# Patient Record
Sex: MALE | Race: BLACK OR AFRICAN AMERICAN | NOT HISPANIC OR LATINO | URBAN - METROPOLITAN AREA
[De-identification: names, ages, dates, MRNs, and addresses within clinical notes are randomized per-mention and may not be internally consistent; named-entity substitution may affect disease eponyms.]

---

## 2023-04-07 ENCOUNTER — EMERGENCY (EMERGENCY)
Facility: HOSPITAL | Age: 63
LOS: 1 days | Discharge: HOME | End: 2023-04-07
Attending: EMERGENCY MEDICINE | Admitting: EMERGENCY MEDICINE
Payer: MEDICARE

## 2023-04-07 VITALS
SYSTOLIC BLOOD PRESSURE: 137 MMHG | RESPIRATION RATE: 17 BRPM | DIASTOLIC BLOOD PRESSURE: 83 MMHG | TEMPERATURE: 98 F | OXYGEN SATURATION: 96 % | HEART RATE: 69 BPM

## 2023-04-07 VITALS
SYSTOLIC BLOOD PRESSURE: 145 MMHG | DIASTOLIC BLOOD PRESSURE: 85 MMHG | HEART RATE: 82 BPM | HEIGHT: 74 IN | TEMPERATURE: 100 F | OXYGEN SATURATION: 97 % | WEIGHT: 186.95 LBS | RESPIRATION RATE: 16 BRPM

## 2023-04-07 DIAGNOSIS — K66.8 OTHER SPECIFIED DISORDERS OF PERITONEUM: ICD-10-CM

## 2023-04-07 DIAGNOSIS — F17.200 NICOTINE DEPENDENCE, UNSPECIFIED, UNCOMPLICATED: ICD-10-CM

## 2023-04-07 DIAGNOSIS — R91.1 SOLITARY PULMONARY NODULE: ICD-10-CM

## 2023-04-07 DIAGNOSIS — K64.9 UNSPECIFIED HEMORRHOIDS: ICD-10-CM

## 2023-04-07 DIAGNOSIS — R10.31 RIGHT LOWER QUADRANT PAIN: ICD-10-CM

## 2023-04-07 DIAGNOSIS — R10.32 LEFT LOWER QUADRANT PAIN: ICD-10-CM

## 2023-04-07 DIAGNOSIS — R10.30 LOWER ABDOMINAL PAIN, UNSPECIFIED: ICD-10-CM

## 2023-04-07 LAB
ALBUMIN SERPL ELPH-MCNC: 3.9 G/DL — SIGNIFICANT CHANGE UP (ref 3.3–5)
ALP SERPL-CCNC: 65 U/L — SIGNIFICANT CHANGE UP (ref 40–120)
ALT FLD-CCNC: 12 U/L — SIGNIFICANT CHANGE UP (ref 10–45)
ANION GAP SERPL CALC-SCNC: 8 MMOL/L — SIGNIFICANT CHANGE UP (ref 5–17)
AST SERPL-CCNC: 26 U/L — SIGNIFICANT CHANGE UP (ref 10–40)
BASOPHILS # BLD AUTO: 0.02 K/UL — SIGNIFICANT CHANGE UP (ref 0–0.2)
BASOPHILS NFR BLD AUTO: 0.3 % — SIGNIFICANT CHANGE UP (ref 0–2)
BILIRUB SERPL-MCNC: 0.4 MG/DL — SIGNIFICANT CHANGE UP (ref 0.2–1.2)
BUN SERPL-MCNC: 12 MG/DL — SIGNIFICANT CHANGE UP (ref 7–23)
CALCIUM SERPL-MCNC: 9.6 MG/DL — SIGNIFICANT CHANGE UP (ref 8.4–10.5)
CHLORIDE SERPL-SCNC: 100 MMOL/L — SIGNIFICANT CHANGE UP (ref 96–108)
CO2 SERPL-SCNC: 27 MMOL/L — SIGNIFICANT CHANGE UP (ref 22–31)
CREAT SERPL-MCNC: 1.01 MG/DL — SIGNIFICANT CHANGE UP (ref 0.5–1.3)
EGFR: 85 ML/MIN/1.73M2 — SIGNIFICANT CHANGE UP
EOSINOPHIL # BLD AUTO: 0.19 K/UL — SIGNIFICANT CHANGE UP (ref 0–0.5)
EOSINOPHIL NFR BLD AUTO: 2.9 % — SIGNIFICANT CHANGE UP (ref 0–6)
GLUCOSE SERPL-MCNC: 79 MG/DL — SIGNIFICANT CHANGE UP (ref 70–99)
HCT VFR BLD CALC: 49 % — SIGNIFICANT CHANGE UP (ref 39–50)
HGB BLD-MCNC: 15.8 G/DL — SIGNIFICANT CHANGE UP (ref 13–17)
IMM GRANULOCYTES NFR BLD AUTO: 0.3 % — SIGNIFICANT CHANGE UP (ref 0–0.9)
LIDOCAIN IGE QN: 44 U/L — SIGNIFICANT CHANGE UP (ref 7–60)
LYMPHOCYTES # BLD AUTO: 1.37 K/UL — SIGNIFICANT CHANGE UP (ref 1–3.3)
LYMPHOCYTES # BLD AUTO: 20.9 % — SIGNIFICANT CHANGE UP (ref 13–44)
MCHC RBC-ENTMCNC: 29 PG — SIGNIFICANT CHANGE UP (ref 27–34)
MCHC RBC-ENTMCNC: 32.2 GM/DL — SIGNIFICANT CHANGE UP (ref 32–36)
MCV RBC AUTO: 89.9 FL — SIGNIFICANT CHANGE UP (ref 80–100)
MONOCYTES # BLD AUTO: 0.58 K/UL — SIGNIFICANT CHANGE UP (ref 0–0.9)
MONOCYTES NFR BLD AUTO: 8.9 % — SIGNIFICANT CHANGE UP (ref 2–14)
NEUTROPHILS # BLD AUTO: 4.37 K/UL — SIGNIFICANT CHANGE UP (ref 1.8–7.4)
NEUTROPHILS NFR BLD AUTO: 66.7 % — SIGNIFICANT CHANGE UP (ref 43–77)
NRBC # BLD: 0 /100 WBCS — SIGNIFICANT CHANGE UP (ref 0–0)
PLATELET # BLD AUTO: 257 K/UL — SIGNIFICANT CHANGE UP (ref 150–400)
POTASSIUM SERPL-MCNC: 4.4 MMOL/L — SIGNIFICANT CHANGE UP (ref 3.5–5.3)
POTASSIUM SERPL-SCNC: 4.4 MMOL/L — SIGNIFICANT CHANGE UP (ref 3.5–5.3)
PROT SERPL-MCNC: 7.7 G/DL — SIGNIFICANT CHANGE UP (ref 6–8.3)
RBC # BLD: 5.45 M/UL — SIGNIFICANT CHANGE UP (ref 4.2–5.8)
RBC # FLD: 14.6 % — HIGH (ref 10.3–14.5)
SODIUM SERPL-SCNC: 135 MMOL/L — SIGNIFICANT CHANGE UP (ref 135–145)
WBC # BLD: 6.55 K/UL — SIGNIFICANT CHANGE UP (ref 3.8–10.5)
WBC # FLD AUTO: 6.55 K/UL — SIGNIFICANT CHANGE UP (ref 3.8–10.5)

## 2023-04-07 PROCEDURE — 80053 COMPREHEN METABOLIC PANEL: CPT

## 2023-04-07 PROCEDURE — 74177 CT ABD & PELVIS W/CONTRAST: CPT | Mod: MA

## 2023-04-07 PROCEDURE — 83690 ASSAY OF LIPASE: CPT

## 2023-04-07 PROCEDURE — 96374 THER/PROPH/DIAG INJ IV PUSH: CPT | Mod: XU

## 2023-04-07 PROCEDURE — 74177 CT ABD & PELVIS W/CONTRAST: CPT | Mod: 26,MA

## 2023-04-07 PROCEDURE — 85025 COMPLETE CBC W/AUTO DIFF WBC: CPT

## 2023-04-07 PROCEDURE — 99284 EMERGENCY DEPT VISIT MOD MDM: CPT | Mod: 25

## 2023-04-07 PROCEDURE — 36415 COLL VENOUS BLD VENIPUNCTURE: CPT

## 2023-04-07 PROCEDURE — 99285 EMERGENCY DEPT VISIT HI MDM: CPT

## 2023-04-07 RX ORDER — SENNA PLUS 8.6 MG/1
2 TABLET ORAL
Qty: 10 | Refills: 0
Start: 2023-04-07 | End: 2023-04-11

## 2023-04-07 RX ORDER — HYDROCORTISONE 1 %
1 OINTMENT (GRAM) TOPICAL
Qty: 1 | Refills: 0
Start: 2023-04-07 | End: 2023-04-13

## 2023-04-07 RX ORDER — HYDROCORTISONE 1 %
1 OINTMENT (GRAM) TOPICAL
Qty: 1 | Refills: 0
Start: 2023-04-07

## 2023-04-07 RX ORDER — DIATRIZOATE MEGLUMINE 180 MG/ML
30 INJECTION, SOLUTION INTRAVESICAL ONCE
Refills: 0 | Status: COMPLETED | OUTPATIENT
Start: 2023-04-07 | End: 2023-04-07

## 2023-04-07 RX ORDER — DOCUSATE SODIUM 100 MG
1 CAPSULE ORAL
Qty: 7 | Refills: 0
Start: 2023-04-07 | End: 2023-04-13

## 2023-04-07 RX ORDER — KETOROLAC TROMETHAMINE 30 MG/ML
15 SYRINGE (ML) INJECTION ONCE
Refills: 0 | Status: DISCONTINUED | OUTPATIENT
Start: 2023-04-07 | End: 2023-04-07

## 2023-04-07 RX ORDER — DOCUSATE SODIUM 100 MG
1 CAPSULE ORAL
Qty: 10 | Refills: 0
Start: 2023-04-07 | End: 2023-04-16

## 2023-04-07 RX ORDER — SENNA PLUS 8.6 MG/1
2 TABLET ORAL
Qty: 14 | Refills: 0
Start: 2023-04-07 | End: 2023-04-13

## 2023-04-07 RX ADMIN — DIATRIZOATE MEGLUMINE 30 MILLILITER(S): 180 INJECTION, SOLUTION INTRAVESICAL at 03:55

## 2023-04-07 RX ADMIN — Medication 15 MILLIGRAM(S): at 03:56

## 2023-04-07 NOTE — ED PROVIDER NOTE - PROGRESS NOTE DETAILS
PA Hellerman 11:22am- Assumed care of pt from pm team pending CT.  CTAP: 1.  Mildly distended loops of mid small bowel with hyperemia of the mesentery, suggesting a degree of inflammation; normal terminal ileum. Findings nonspecific. Findings suggestive of an enteritis, however,   cannot exclude a partial small bowel obstruction although less likely. Cannot exclude inflammatory bowel disease/Crohn's disease, however, no areas of abnormal smallbowel wall thickening. 2.  Indeterminate 9 x 16 mm right lower lobe subpleural nodule with linear extension into the right lower lobe. Possibly a scar, however, recommend 6 month interval follow-up for assessment of stability to   exclude a more aggressive etiology.  Pt seen by surgery team, who feels SBO unlikely, states cleared for dc  Pt tolerating po, feeling better  Will DC with bowel regimen  DC with strict return precautions

## 2023-04-07 NOTE — CONSULT NOTE ADULT - ASSESSMENT
62M no PMH/PSH pw 5 days constipation. For the past 5d patient has been unable to pass BM with straining. +F. WBC 6.5. Afebrile, VSS. Exam reassuring. CTAP with mildly dilated small bowel, read cannot exclude SBO. No transition point, contrast in rectum. Most likely diagnosis is constipation.    No surgical intervention, does not require surgical admission  Recommend miralax/senna/tap water enema  PO challenge  Optional outpatient follow up for hemorrhoids with Dr. Ayala  Discussed with chief resident/attg  Team 4c signing off

## 2023-04-07 NOTE — ED ADULT NURSE REASSESSMENT NOTE - NS ED NURSE REASSESS COMMENT FT1
Received report from night shift RN. Pt resting comfortably in stretcher. Respirations even and unlabored. Updated pt on plan of care.

## 2023-04-07 NOTE — ED PROVIDER NOTE - CARE PROVIDER_API CALL
Zeb Ayala)  ColonRectal Surgery; Surgery  [address] [place] [address], [address]  [place] [address]  Phone: [phone]  Fax: [phone]  Follow Up Time:

## 2023-04-07 NOTE — ED PROVIDER NOTE - PATIENT PORTAL LINK FT
You can access the FollowMyHealth Patient Portal offered by [facility] by registering at the following website: [email or web address]. By joining [facility]’s FollowMyHealth portal, you will also be able to view your health information using other applications (apps) compatible with our system.

## 2023-04-07 NOTE — ED PROVIDER NOTE - NSFOLLOWUPINSTRUCTIONS_ED_ALL_ED_FT
Hemorrhoids  The colon, with 3 close-ups of the rectum. One is normal and the other 2 show external and internal hemorrhoids.  Hemorrhoids are swollen veins that may develop:  In the butt (rectum). These are called internal hemorrhoids.  Around the opening of the butt (anus). These are called external hemorrhoids.  Hemorrhoids can cause pain, itching, or bleeding. Most of the time, they do not cause serious problems. They usually get better with diet changes, lifestyle changes, and other home treatments.    What are the causes?  This condition may be caused by:  Having trouble pooping (constipation).  Pushing hard (straining) to poop.  Watery poop (diarrhea).  Pregnancy.  Being very overweight (obese).  Sitting for long periods of time.  Heavy lifting or other activity that causes you to strain.  Anal sex.  Riding a bike for a long period of time.  What are the signs or symptoms?  Symptoms of this condition include:  Pain.  Itching or soreness in the butt.  Bleeding from the butt.  Leaking poop.  Swelling in the area.  One or more lumps around the opening of your butt.    How is this diagnosed?  A doctor can often diagnose this condition by looking at the affected area. The doctor may also:  Do an exam that involves feeling the area with a gloved hand (digital rectal exam).  Examine the area inside your butt using a small tube (anoscope).  Order blood tests. This may be done if you have lost a lot of blood.  Have you get a test that involves looking inside the colon using a flexible tube with a camera on the end (sigmoidoscopy or colonoscopy).    How is this treated?  This condition can usually be treated at home. Your doctor may tell you to change what you eat, make lifestyle changes, or try home treatments. If these do not help, procedures can be done to remove the hemorrhoids or make them smaller. These may involve:  Placing rubber bands at the base of the hemorrhoids to cut off their blood supply.  Injecting medicine into the hemorrhoids to shrink them.  Shining a type of light energy onto the hemorrhoids to cause them to fall off.  Doing surgery to remove the hemorrhoids or cut off their blood supply.  Follow these instructions at home:    Eating and drinking    A sampling of vegetables and other plant-based foods.  Eat foods that have a lot of fiber in them. These include whole grains, beans, nuts, fruits, and vegetables.  Ask your doctor about taking products that have added fiber (fibersupplements).  Reduce the amount of fat in your diet. You can do this by:  Eating low-fat dairy products.  Eating less red meat.  Avoiding processed foods.  Drink enough fluid to keep your pee (urine) pale yellow.    Managing pain and swelling    A bathtub partially filled with water.  Take a warm-water bath (sitz bath) for 20 minutes to ease pain. Do this 3–4 times a day. You may do this in a bathtub or using a portable sitz bath that fits over the toilet.  If told, put ice on the painful area. It may be helpful to use ice between your warm baths.  Put ice in a plastic bag.  Place a towel between your skin and the bag.  Leave the ice on for 20 minutes, 2–3 times a day.    General instructions    Take over-the-counter and prescription medicines only as told by your doctor.  Medicated creams and medicines may be used as told.  Exercise often. Ask your doctor how much and what kind of exercise is best for you.  Go to the bathroom when you have the urge to poop. Do not wait.  Avoid pushing too hard when you poop.  Keep your butt dry and clean. Use wet toilet paper or moist towelettes after pooping.  Do not sit on the toilet for a long time.  Keep all follow-up visits as told by your doctor. This is important.  Contact a doctor if you:  Have pain and swelling that do not get better with treatment or medicine.  Have trouble pooping.  Cannot poop.  Have pain or swelling outside the area of the hemorrhoids.  Get help right away if you have:  Bleeding that will not stop.    Summary  Hemorrhoids are swollen veins in the butt or around the opening of the butt.  They can cause pain, itching, or bleeding.  Eat foods that have a lot of fiber in them. These include whole grains, beans, nuts, fruits, and vegetables.  Take a warm-water bath (sitz bath) for 20 minutes to ease pain. Do this 3–4 times a day.  This information is not intended to replace advice given to you by your health care provider. Make sure you discuss any questions you have with your health care provider. You may try over the counter Miralax, Senna, and an Enema as needed for constipation.    Please know that no emergency visit is complete without follow-up with your primary care provider in 1 week.  Please bring copies of all discharge papers and results and show to your doctor.      Please continue taking all previous medications as instructed unless we discussed otherwise.     I appreciated your patience and hope you feel better soon.     Return to ER immediately if you develop fevers, chills, chest pain, shortness of breath, worsening and/or any concerning symptoms.  ----    Hemorrhoids  The colon, with 3 close-ups of the rectum. One is normal and the other 2 show external and internal hemorrhoids.  Hemorrhoids are swollen veins that may develop:  In the butt (rectum). These are called internal hemorrhoids.  Around the opening of the butt (anus). These are called external hemorrhoids.  Hemorrhoids can cause pain, itching, or bleeding. Most of the time, they do not cause serious problems. They usually get better with diet changes, lifestyle changes, and other home treatments.    What are the causes?  This condition may be caused by:  Having trouble pooping (constipation).  Pushing hard (straining) to poop.  Watery poop (diarrhea).  Pregnancy.  Being very overweight (obese).  Sitting for long periods of time.  Heavy lifting or other activity that causes you to strain.  Anal sex.  Riding a bike for a long period of time.  What are the signs or symptoms?  Symptoms of this condition include:  Pain.  Itching or soreness in the butt.  Bleeding from the butt.  Leaking poop.  Swelling in the area.  One or more lumps around the opening of your butt.    How is this diagnosed?  A doctor can often diagnose this condition by looking at the affected area. The doctor may also:  Do an exam that involves feeling the area with a gloved hand (digital rectal exam).  Examine the area inside your butt using a small tube (anoscope).  Order blood tests. This may be done if you have lost a lot of blood.  Have you get a test that involves looking inside the colon using a flexible tube with a camera on the end (sigmoidoscopy or colonoscopy).    How is this treated?  This condition can usually be treated at home. Your doctor may tell you to change what you eat, make lifestyle changes, or try home treatments. If these do not help, procedures can be done to remove the hemorrhoids or make them smaller. These may involve:  Placing rubber bands at the base of the hemorrhoids to cut off their blood supply.  Injecting medicine into the hemorrhoids to shrink them.  Shining a type of light energy onto the hemorrhoids to cause them to fall off.  Doing surgery to remove the hemorrhoids or cut off their blood supply.  Follow these instructions at home:    Eating and drinking    A sampling of vegetables and other plant-based foods.  Eat foods that have a lot of fiber in them. These include whole grains, beans, nuts, fruits, and vegetables.  Ask your doctor about taking products that have added fiber (fibersupplements).  Reduce the amount of fat in your diet. You can do this by:  Eating low-fat dairy products.  Eating less red meat.  Avoiding processed foods.  Drink enough fluid to keep your pee (urine) pale yellow.    Managing pain and swelling    A bathtub partially filled with water.  Take a warm-water bath (sitz bath) for 20 minutes to ease pain. Do this 3–4 times a day. You may do this in a bathtub or using a portable sitz bath that fits over the toilet.  If told, put ice on the painful area. It may be helpful to use ice between your warm baths.  Put ice in a plastic bag.  Place a towel between your skin and the bag.  Leave the ice on for 20 minutes, 2–3 times a day.    General instructions    Take over-the-counter and prescription medicines only as told by your doctor.  Medicated creams and medicines may be used as told.  Exercise often. Ask your doctor how much and what kind of exercise is best for you.  Go to the bathroom when you have the urge to poop. Do not wait.  Avoid pushing too hard when you poop.  Keep your butt dry and clean. Use wet toilet paper or moist towelettes after pooping.  Do not sit on the toilet for a long time.  Keep all follow-up visits as told by your doctor. This is important.  Contact a doctor if you:  Have pain and swelling that do not get better with treatment or medicine.  Have trouble pooping.  Cannot poop.  Have pain or swelling outside the area of the hemorrhoids.  Get help right away if you have:  Bleeding that will not stop.    Summary  Hemorrhoids are swollen veins in the butt or around the opening of the butt.  They can cause pain, itching, or bleeding.  Eat foods that have a lot of fiber in them. These include whole grains, beans, nuts, fruits, and vegetables.  Take a warm-water bath (sitz bath) for 20 minutes to ease pain. Do this 3–4 times a day.  This information is not intended to replace advice given to you by your health care provider. Make sure you discuss any questions you have with your health care provider.

## 2023-04-07 NOTE — ED PROVIDER NOTE - CLINICAL SUMMARY MEDICAL DECISION MAKING FREE TEXT BOX
lower abd pain, constipation, small nonthrombosed hemorrhoid, afebrile, nontoxic  constipation vs. sbo  -check labs  -toradol  -CT a/p to r/o intraabd path

## 2023-04-07 NOTE — ED PROVIDER NOTE - OBJECTIVE STATEMENT
62M no pmh c/o lower abd pain. 62M no pmh c/o lower abd pain. pt states he has not had a bowel movement in 5 days. states straining and felt a ball pop out of his rectum, states tender. no vomiting. no prior constipation. states did not take anything for it. no fevers. no recent travel. no sick contacts. no bleeding.

## 2023-04-07 NOTE — CONSULT NOTE ADULT - SUBJECTIVE AND OBJECTIVE BOX
62M no PMH/PSH pw 5 days constipation. For the past 5d patient has been unable to pass BM with straining. Felt a "pop" in rectum yesterday. No abd pain, nausea, or vomiting. Tolerating PO, last PO this am eggs and donuts. Patient has been passing flatus during this time. No fever/chills. -cp-sob.    Last colonoscopy 6y ago, 2 benign polyps.    HPI:        PAST MEDICAL & SURGICAL HISTORY:  No significant past surgical history          MEDICATIONS  (STANDING):    MEDICATIONS  (PRN):      Allergies    No Known Allergies    Intolerances        SOCIAL HISTORY:    FAMILY HISTORY:          Physical Exam:  General: NAD, resting comfortably  HEENT: NC/AT, EOMI, normal hearing, no oral lesions, no LAD, neck supple  Pulmonary: normal resp effort, CTA-B  Cardiovascular: NSR, no murmurs  Abdominal: soft, ND/NT, no organomegaly  Extremities: WWP, normal strength, no clubbing/cyanosis/edema  Neuro: A/O x 3, CNs II-XII grossly intact, normal sensation, no focal deficits  Pulses: palpable distal pulses    Vital Signs Last 24 Hrs  T(C): 36.6 (07 Apr 2023 08:44), Max: 37.5 (07 Apr 2023 02:42)  T(F): 97.8 (07 Apr 2023 08:44), Max: 99.5 (07 Apr 2023 02:42)  HR: 80 (07 Apr 2023 08:44) (77 - 82)  BP: 144/90 (07 Apr 2023 08:44) (144/90 - 150/92)  BP(mean): --  RR: 17 (07 Apr 2023 08:44) (16 - 17)  SpO2: 98% (07 Apr 2023 08:44) (97% - 98%)    Parameters below as of 07 Apr 2023 06:25  Patient On (Oxygen Delivery Method): room air        I&O's Summary          LABS:                        15.8   6.55  )-----------( 257      ( 07 Apr 2023 04:19 )             49.0     04-07    135  |  100  |  12  ----------------------------<  79  4.4   |  27  |  1.01    Ca    9.6      07 Apr 2023 04:19    TPro  7.7  /  Alb  3.9  /  TBili  0.4  /  DBili  x   /  AST  26  /  ALT  12  /  AlkPhos  65  04-07        CAPILLARY BLOOD GLUCOSE        LIVER FUNCTIONS - ( 07 Apr 2023 04:19 )  Alb: 3.9 g/dL / Pro: 7.7 g/dL / ALK PHOS: 65 U/L / ALT: 12 U/L / AST: 26 U/L / GGT: x             Cultures:      RADIOLOGY & ADDITIONAL STUDIES:      Plan:           62M no PMH/PSH pw 5 days constipation. For the past 5d patient has been unable to pass BM with straining. Felt a "pop" in rectum yesterday. No abd pain, but reporting discomfort and urge to defecate. No nausea, or vomiting. Tolerating PO, last PO this am eggs and donuts. Patient has been passing flatus during this time. No fever/chills. -cp-sob. Surgery consulted because CTAP read could not exclude SBO.    Last colonoscopy 6y ago, 2 benign polyps.        PAST MEDICAL & SURGICAL HISTORY:  No significant past surgical history          MEDICATIONS  (STANDING):    MEDICATIONS  (PRN):      Allergies    No Known Allergies    Intolerances        SOCIAL HISTORY:  recreational tobacco/cocaine use. Minimal alcohol.   FAMILY HISTORY:          Physical Exam:  General: NAD, resting comfortably  HEENT: MMM  Pulmonary: normal resp effort  Cardiovascular: RRR  Abdominal: soft, ND/NT, -r-g  Extremities: WWP  Neuro: A/O x 3  Pulses: palpable distal pulses    Vital Signs Last 24 Hrs  T(C): 36.6 (07 Apr 2023 08:44), Max: 37.5 (07 Apr 2023 02:42)  T(F): 97.8 (07 Apr 2023 08:44), Max: 99.5 (07 Apr 2023 02:42)  HR: 80 (07 Apr 2023 08:44) (77 - 82)  BP: 144/90 (07 Apr 2023 08:44) (144/90 - 150/92)  BP(mean): --  RR: 17 (07 Apr 2023 08:44) (16 - 17)  SpO2: 98% (07 Apr 2023 08:44) (97% - 98%)    Parameters below as of 07 Apr 2023 06:25  Patient On (Oxygen Delivery Method): room air        I&O's Summary          LABS:                        15.8   6.55  )-----------( 257      ( 07 Apr 2023 04:19 )             49.0     04-07    135  |  100  |  12  ----------------------------<  79  4.4   |  27  |  1.01    Ca    9.6      07 Apr 2023 04:19    TPro  7.7  /  Alb  3.9  /  TBili  0.4  /  DBili  x   /  AST  26  /  ALT  12  /  AlkPhos  65  04-07        CAPILLARY BLOOD GLUCOSE        LIVER FUNCTIONS - ( 07 Apr 2023 04:19 )  Alb: 3.9 g/dL / Pro: 7.7 g/dL / ALK PHOS: 65 U/L / ALT: 12 U/L / AST: 26 U/L / GGT: x             Cultures:      RADIOLOGY & ADDITIONAL STUDIES:      Plan:

## 2023-04-07 NOTE — ED ADULT NURSE NOTE - HOW OFTEN DO YOU HAVE A DRINK CONTAINING ALCOHOL?
Never How Severe Are Your Molluscum?: moderate Is This A New Presentation, Or A Follow-Up?: Follow Up Molluscum Contagiosum

## 2023-04-07 NOTE — ED ADULT NURSE NOTE - OBJECTIVE STATEMENT
Pt is a 62yM endorsing constipation for 5 days and states he has hemorrhoids. Endorses minimal abd pain. Pt ambulated by self with NAD, spon breathing on RA, unlabored. NKA.

## 2023-04-07 NOTE — ED ADULT TRIAGE NOTE - CHIEF COMPLAINT QUOTE
pt c/o constipation/ abd pain x 5 days, hemorrhoids beginning x 3 days. denies any med use. pt c/o constipation x 5 days, hemorrhoids beginning x 3 days. denies any med use.

## 2025-03-25 NOTE — ED PROVIDER NOTE - TOBACCO USE
Spoke with patient. Let him know that PA's for [facility] are about 5-7 weeks out.   Current some day smoker